# Patient Record
Sex: MALE | Race: WHITE | NOT HISPANIC OR LATINO
[De-identification: names, ages, dates, MRNs, and addresses within clinical notes are randomized per-mention and may not be internally consistent; named-entity substitution may affect disease eponyms.]

---

## 2020-03-30 ENCOUNTER — TRANSCRIPTION ENCOUNTER (OUTPATIENT)
Age: 70
End: 2020-03-30

## 2022-08-10 PROBLEM — Z00.00 ENCOUNTER FOR PREVENTIVE HEALTH EXAMINATION: Status: ACTIVE | Noted: 2022-08-10

## 2022-08-25 DIAGNOSIS — K64.8 OTHER HEMORRHOIDS: ICD-10-CM

## 2022-08-26 ENCOUNTER — APPOINTMENT (OUTPATIENT)
Dept: SURGERY | Facility: CLINIC | Age: 72
End: 2022-08-26

## 2022-08-26 VITALS
OXYGEN SATURATION: 98 % | BODY MASS INDEX: 29.52 KG/M2 | TEMPERATURE: 96.6 F | HEIGHT: 74 IN | RESPIRATION RATE: 17 BRPM | WEIGHT: 230 LBS | DIASTOLIC BLOOD PRESSURE: 72 MMHG | SYSTOLIC BLOOD PRESSURE: 123 MMHG | HEART RATE: 69 BPM

## 2022-08-26 DIAGNOSIS — K50.10 CROHN'S DISEASE OF LARGE INTESTINE W/OUT COMPLICATIONS: ICD-10-CM

## 2022-08-26 DIAGNOSIS — Z87.891 PERSONAL HISTORY OF NICOTINE DEPENDENCE: ICD-10-CM

## 2022-08-26 PROCEDURE — 99203 OFFICE O/P NEW LOW 30 MIN: CPT | Mod: 25

## 2022-08-26 PROCEDURE — 46600 DIAGNOSTIC ANOSCOPY SPX: CPT

## 2022-08-26 RX ORDER — CHROMIUM 200 MCG
TABLET ORAL
Refills: 0 | Status: ACTIVE | COMMUNITY

## 2022-08-26 RX ORDER — LEVOTHYROXINE SODIUM 150 UG/1
150 TABLET ORAL
Refills: 0 | Status: ACTIVE | COMMUNITY

## 2022-08-26 RX ORDER — BUDESONIDE 3 MG/1
3 CAPSULE ORAL
Refills: 0 | Status: ACTIVE | COMMUNITY

## 2022-08-26 RX ORDER — OMEGA-3/DHA/EPA/FISH OIL 300-1000MG
CAPSULE ORAL
Refills: 0 | Status: ACTIVE | COMMUNITY

## 2022-08-26 RX ORDER — MESALAMINE 1.2 G/1
1.2 TABLET, DELAYED RELEASE ORAL
Refills: 0 | Status: ACTIVE | COMMUNITY

## 2022-08-26 RX ORDER — PREDNISONE 10 MG/1
10 TABLET ORAL
Refills: 0 | Status: ACTIVE | COMMUNITY

## 2022-08-26 RX ORDER — IRON/IRON ASP GLY/FA/MV-MIN 38 125-25-1MG
TABLET ORAL
Refills: 0 | Status: ACTIVE | COMMUNITY

## 2022-08-26 RX ORDER — COLD-HOT PACK
EACH MISCELLANEOUS
Refills: 0 | Status: ACTIVE | COMMUNITY

## 2022-08-26 RX ORDER — VEDOLIZUMAB 300 MG/5ML
300 INJECTION, POWDER, LYOPHILIZED, FOR SOLUTION INTRAVENOUS
Refills: 0 | Status: ACTIVE | COMMUNITY

## 2022-08-26 RX ORDER — PNV NO.95/FERROUS FUM/FOLIC AC 28MG-0.8MG
TABLET ORAL
Refills: 0 | Status: ACTIVE | COMMUNITY

## 2022-08-26 NOTE — ASSESSMENT
[FreeTextEntry1] : In summary the patient has Crohn's disease and had a recent CT which suggested an anal fistula.  He has no symptoms of pain bleeding or drainage and has no evidence of anal fistula on examination.  I recommended conservative treatment with continued medical management of his Crohn's disease.  If he develops symptoms of an anal fistula I would send him for an MRI of the pelvis for further evaluation.

## 2022-08-26 NOTE — HISTORY OF PRESENT ILLNESS
[FreeTextEntry1] : ALEXANDRO  is a 72 year  male  here for a consultation. \par \par Colonoscopy 09/20/2019 Dr. Bangura - Internal hemorrhoids, Normal mucosa to the cecum and distal ileum - A1\par \par Pathology- \par F. Cecum, biopsy;\par Colonic mucosa without significant changes. No active colitis, granulomata, diagnostic features of microscopic colitis, or dysplasia present.\par G. clinically labeled as 'Terminal ileum", biopsy;\par Colonic mucosa without significant changes. No active colitis, granulomata, diagnostic features of microscopic colitis, or dysplasia present.\par H. Ascending, biopsy;\par Colonic mucosa without significant changes. No active colitis, granuloma, diagnostic features of microscopic colitis, or dysplasia present.\par I. Proximal tranverse,biopsy;\par Colonic mucosa without significant changes. No active colitis, granulomata, diagnostic features of microscopic colitis, or dysplasia present.\par J. Mild transverse, biopsy:\par Colonic mucosa without significant changes. No active colitis, granulomata, diagnostic features of microscopic colitis, or dysplasia present.\par K. Distal transverse, biopsy:\par Colonic mucosa without significant changes. No active colitis, granulomata, diagnostic features of microscopic colitis, or dysplasia present.\par L. Descending,biopsy;\par Colonic mucosa without significant changes. No active colitis, granulomata, diagnostic features of microscopic colitis, or dysplasia present.\par M. Proximal sigmoid,biopsy;\par Colonic mucosa without significant changes. No active colitis, granulomata, diagnostic features of microscopic colitis, or dysplasia present.\par N. Mid sigmoid,biopsy;\par Colonic mucosa without significant changes. No active colitis, granulomata, diagnostic features of microscopic colitis, or dysplasia present.\par O. Distal sigmoid, biopsy;\par Colonic mucosa without significant changes. No active colitis, granulomata, diagnostic features of microscopic colitis, or dysplasia present.\par R. Rectal,biopsy;\par Colonic mucosa without significant changes. No active colitis, granulomata, diagnostic features of microscopic colitis, or dysplasia present.\par \par CT 01/21/2022- \par 1. No evidence of abdominopelvic metastatic disease.\par 2. Wall thickening of the terminal ileum compatible with ileitis.\par 3. Cholelithiasis. \par \par Today pt denies anal rectal pain bleeding or drainage.  Takes immunotherapy for Mohs sx, hx of radiation, radiation ended in 2020 . BM daily, normal, no bleeding, no straining, no pain, denies swelling or prolapsed tissue. Denies nausea and vomiting. Denies fevers or chills. Not on anticoagulants.  He had a CT abdomen pelvis in July 2022 which apparently suggested an anal fistula.

## 2022-08-26 NOTE — CONSULT LETTER
[Dear  ___] : Dear  [unfilled], [Consult Letter:] : I had the pleasure of evaluating your patient, [unfilled]. [Please see my note below.] : Please see my note below. [Consult Closing:] : Thank you very much for allowing me to participate in the care of this patient.  If you have any questions, please do not hesitate to contact me. [Sincerely,] : Sincerely, [FreeTextEntry3] : Nathan Miner M.D., F.A.C.S, F.A.S.C.R.S [DrCady  ___] : Dr. HALL

## 2022-08-26 NOTE — PHYSICAL EXAM
[Normal Breath Sounds] : Normal breath sounds [Normal Heart Sounds] : normal heart sounds [Alert] : alert [Oriented to Person] : oriented to person [Oriented to Place] : oriented to place [Oriented to Time] : oriented to time [Calm] : calm [Abdomen Masses] : No abdominal masses [Abdomen Tenderness] : ~T No ~M abdominal tenderness [No HSM] : no hepatosplenomegaly [de-identified] : Perianal inspection digital rectal examination and anoscopy revealed no evidence of fissure fistula or abscess.  There are mild nonbleeding internal hemorrhoids and otherwise normal distal rectal mucosa. [de-identified] : WNL [de-identified] : WNL [de-identified] : MELECIOL [de-identified] : WNL ROM [de-identified] : WNL

## 2023-08-07 ENCOUNTER — OUTPATIENT (OUTPATIENT)
Dept: OUTPATIENT SERVICES | Facility: HOSPITAL | Age: 73
LOS: 1 days | Discharge: HOME | End: 2023-08-07
Payer: MEDICARE

## 2023-08-07 ENCOUNTER — APPOINTMENT (OUTPATIENT)
Dept: RADIOLOGY | Facility: HOSPITAL | Age: 73
End: 2023-08-07

## 2023-08-07 DIAGNOSIS — R13.10 DYSPHAGIA, UNSPECIFIED: ICD-10-CM

## 2023-08-07 PROCEDURE — 74220 X-RAY XM ESOPHAGUS 1CNTRST: CPT | Mod: 26
